# Patient Record
Sex: MALE | Race: WHITE | NOT HISPANIC OR LATINO | Employment: FULL TIME | URBAN - METROPOLITAN AREA
[De-identification: names, ages, dates, MRNs, and addresses within clinical notes are randomized per-mention and may not be internally consistent; named-entity substitution may affect disease eponyms.]

---

## 2019-04-23 ENCOUNTER — OFFICE VISIT (OUTPATIENT)
Dept: URGENT CARE | Facility: CLINIC | Age: 34
End: 2019-04-23

## 2019-04-23 VITALS
DIASTOLIC BLOOD PRESSURE: 64 MMHG | HEIGHT: 72 IN | SYSTOLIC BLOOD PRESSURE: 102 MMHG | BODY MASS INDEX: 28.71 KG/M2 | TEMPERATURE: 98 F | WEIGHT: 212 LBS | RESPIRATION RATE: 16 BRPM | HEART RATE: 84 BPM | OXYGEN SATURATION: 96 %

## 2019-04-23 DIAGNOSIS — Z02.89 ENCOUNTER FOR PHYSICAL EXAMINATION RELATED TO EMPLOYMENT: ICD-10-CM

## 2019-04-23 DIAGNOSIS — Z02.1 PRE-EMPLOYMENT DRUG SCREENING: ICD-10-CM

## 2019-04-23 LAB
AMP AMPHETAMINE: NORMAL
COC COCAINE: NORMAL
INT CON NEG: NORMAL
INT CON POS: NORMAL
MET METHAMPHETAMINES: NORMAL
OPI OPIATES: NORMAL
PCP PHENCYCLIDINE: NORMAL
POC DRUG COMMENT 753798-OCCUPATIONAL HEALTH: NORMAL
THC: NORMAL

## 2019-04-23 PROCEDURE — 8912 PR VISION SCREENING: Performed by: PHYSICIAN ASSISTANT

## 2019-04-23 PROCEDURE — 80305 DRUG TEST PRSMV DIR OPT OBS: CPT | Performed by: PHYSICIAN ASSISTANT

## 2019-04-23 PROCEDURE — 8915 PR COMPREHENSIVE PHYSICAL: Performed by: PHYSICIAN ASSISTANT

## 2019-04-23 PROCEDURE — 94010 BREATHING CAPACITY TEST: CPT | Performed by: PHYSICIAN ASSISTANT

## 2019-04-23 PROCEDURE — 92553 AUDIOMETRY AIR & BONE: CPT | Performed by: PHYSICIAN ASSISTANT

## 2019-04-23 ASSESSMENT — ENCOUNTER SYMPTOMS
HEADACHES: 0
DIAPHORESIS: 0
EYE REDNESS: 0
DIZZINESS: 0
VOMITING: 0
ABDOMINAL PAIN: 0
CHILLS: 0
WEAKNESS: 0
DOUBLE VISION: 0
COUGH: 0
SORE THROAT: 0
MYALGIAS: 0
NECK PAIN: 0
PALPITATIONS: 0
NAUSEA: 0
FEVER: 0
PHOTOPHOBIA: 0
EYE DISCHARGE: 0
SINUS PAIN: 0
SHORTNESS OF BREATH: 0
DIARRHEA: 0
EYE PAIN: 0
CONSTIPATION: 0
BLURRED VISION: 0

## 2019-04-23 NOTE — PROGRESS NOTES
Subjective:   Steve Garcia is a 33 y.o. male who presents for Employment Physical (Px, Audio, Kuldeep, UDS, Vision, Mask Fit/Resp. Clearance)       Patient presents today for employment physical. He states that he has no past medical history and takes no medications. He has no concerns today.       Review of Systems   Constitutional: Negative for chills, diaphoresis and fever.   HENT: Negative for congestion, ear discharge, ear pain, sinus pain and sore throat.    Eyes: Negative for blurred vision, double vision, photophobia, pain, discharge and redness.   Respiratory: Negative for cough and shortness of breath.    Cardiovascular: Negative for chest pain and palpitations.   Gastrointestinal: Negative for abdominal pain, constipation, diarrhea, nausea and vomiting.   Genitourinary: Negative for dysuria, frequency and urgency.   Musculoskeletal: Negative for joint pain, myalgias and neck pain.   Skin: Negative for rash.   Neurological: Negative for dizziness, weakness and headaches.   All other systems reviewed and are negative.      PMH:  has no past medical history on file.    MEDS: No current outpatient prescriptions on file.    ALLERGIES: No Known Allergies    SURGHX: History reviewed. No pertinent surgical history.    SOCHX:  reports that he has quit smoking. He has never used smokeless tobacco. He reports that he does not drink alcohol or use drugs.    FH: Reviewed with patient, not pertinent to this visit.     Objective:   /64   Pulse 84   Temp 36.7 °C (98 °F) (Temporal)   Resp 16   Ht 1.829 m (6')   Wt 96.2 kg (212 lb)   SpO2 96%   BMI 28.75 kg/m²   Physical Exam   Constitutional: He is oriented to person, place, and time. He appears well-developed and well-nourished. No distress.   HENT:   Head: Normocephalic and atraumatic.   Right Ear: Tympanic membrane, external ear and ear canal normal.   Left Ear: Tympanic membrane, external ear and ear canal normal.   Nose: Nose normal.    Mouth/Throat: Uvula is midline, oropharynx is clear and moist and mucous membranes are normal.   Eyes: Pupils are equal, round, and reactive to light. Conjunctivae and EOM are normal.   Neck: Normal range of motion. Neck supple. No tracheal deviation present.   Cardiovascular: Normal rate, regular rhythm and normal heart sounds.    Pulmonary/Chest: Effort normal and breath sounds normal. No respiratory distress. He has no wheezes. He has no rhonchi. He has no rales.   Abdominal: Soft. Bowel sounds are normal. He exhibits no distension and no mass. There is no hepatosplenomegaly. There is no tenderness. There is no rigidity, no rebound, no guarding and no CVA tenderness.   Musculoskeletal:   ROM normal all four extremities   Lymphadenopathy:     He has no cervical adenopathy.   Neurological: He is alert and oriented to person, place, and time. He has normal strength. No cranial nerve deficit or sensory deficit. He displays a negative Romberg sign. Coordination and gait normal. GCS eye subscore is 4. GCS verbal subscore is 5. GCS motor subscore is 6.   Skin: Skin is warm and dry.   Psychiatric: He has a normal mood and affect. His behavior is normal. Judgment and thought content normal.   Vitals reviewed.      Assessment/Plan:   1. Encounter for physical examination related to employment    2. Pre-employment drug screening  - POCT 6 Panel Urine Drug Screen    - Exam, Spirometry, Audiometry, Vision WNL  - Paperwork completed  - Advised to return prn    Differential diagnosis, natural history, supportive care, and indications for immediate follow-up discussed.